# Patient Record
Sex: FEMALE | Race: WHITE | URBAN - METROPOLITAN AREA
[De-identification: names, ages, dates, MRNs, and addresses within clinical notes are randomized per-mention and may not be internally consistent; named-entity substitution may affect disease eponyms.]

---

## 2019-04-16 ENCOUNTER — HOSPITAL ENCOUNTER (OUTPATIENT)
Dept: OCCUPATIONAL THERAPY | Facility: HOSPITAL | Age: 49
End: 2019-04-17
Attending: INTERNAL MEDICINE | Admitting: INTERNAL MEDICINE

## 2019-04-25 ENCOUNTER — HISTORICAL (OUTPATIENT)
Dept: INFUSION THERAPY | Facility: HOSPITAL | Age: 49
End: 2019-04-25

## 2019-04-29 ENCOUNTER — HISTORICAL (OUTPATIENT)
Dept: ADMINISTRATIVE | Facility: HOSPITAL | Age: 49
End: 2019-04-29

## 2019-04-29 LAB
ABS NEUT (OLG): 3.89 X10(3)/MCL (ref 2.1–9.2)
ALBUMIN SERPL-MCNC: 3.7 GM/DL (ref 3.4–5)
ALBUMIN/GLOB SERPL: 1.1 {RATIO}
ALP SERPL-CCNC: 64 UNIT/L (ref 38–126)
ALT SERPL-CCNC: 42 UNIT/L (ref 12–78)
ANISOCYTOSIS BLD QL SMEAR: ABNORMAL
AST SERPL-CCNC: 34 UNIT/L (ref 15–37)
BASOPHILS # BLD AUTO: 0.1 X10(3)/MCL (ref 0–0.2)
BASOPHILS NFR BLD AUTO: 1 %
BILIRUB SERPL-MCNC: 0.5 MG/DL (ref 0.2–1)
BILIRUBIN DIRECT+TOT PNL SERPL-MCNC: 0.1 MG/DL (ref 0–0.2)
BILIRUBIN DIRECT+TOT PNL SERPL-MCNC: 0.4 MG/DL (ref 0–0.8)
BUN SERPL-MCNC: 14 MG/DL (ref 7–18)
CALCIUM SERPL-MCNC: 8.7 MG/DL (ref 8.5–10.1)
CHLORIDE SERPL-SCNC: 110 MMOL/L (ref 98–107)
CO2 SERPL-SCNC: 25 MMOL/L (ref 21–32)
CREAT SERPL-MCNC: 1.42 MG/DL (ref 0.55–1.02)
EOSINOPHIL # BLD AUTO: 0.2 X10(3)/MCL (ref 0–0.9)
EOSINOPHIL NFR BLD AUTO: 3.1 %
ERYTHROCYTE [DISTWIDTH] IN BLOOD BY AUTOMATED COUNT: 26.6 % (ref 11.5–17)
GLOBULIN SER-MCNC: 3.3 GM/DL (ref 2.4–3.5)
GLUCOSE SERPL-MCNC: 107 MG/DL (ref 74–106)
HCT VFR BLD AUTO: 37 % (ref 37–47)
HGB BLD-MCNC: 10.8 GM/DL (ref 12–16)
HYPOCHROMIA BLD QL SMEAR: ABNORMAL
LYMPHOCYTES # BLD AUTO: 2.3 X10(3)/MCL (ref 0.6–4.6)
LYMPHOCYTES NFR BLD AUTO: 33 %
MCH RBC QN AUTO: 21.5 PG (ref 27–31)
MCHC RBC AUTO-ENTMCNC: 29.2 GM/DL (ref 33–36)
MCV RBC AUTO: 73.6 FL (ref 80–94)
MICROCYTES BLD QL SMEAR: ABNORMAL
MONOCYTES # BLD AUTO: 0.6 X10(3)/MCL (ref 0.1–1.3)
MONOCYTES NFR BLD AUTO: 7.9 %
NEUTROPHILS # BLD AUTO: 3.9 X10(3)/MCL (ref 2.1–9.2)
NEUTROPHILS NFR BLD AUTO: 54.9 %
PLATELET # BLD AUTO: 613 X10(3)/MCL (ref 130–400)
PLATELET # BLD EST: ABNORMAL 10*3/UL
PMV BLD AUTO: 8.8 FL (ref 9.4–12.4)
POIKILOCYTOSIS BLD QL SMEAR: ABNORMAL
POTASSIUM SERPL-SCNC: 5 MMOL/L (ref 3.5–5.1)
PROT SERPL-MCNC: 7 GM/DL (ref 6.4–8.2)
RBC # BLD AUTO: 5.03 X10(6)/MCL (ref 4.2–5.4)
RBC MORPH BLD: ABNORMAL
SODIUM SERPL-SCNC: 139 MMOL/L (ref 136–145)
WBC # SPEC AUTO: 7.1 X10(3)/MCL (ref 4.5–11.5)

## 2019-05-02 ENCOUNTER — HISTORICAL (OUTPATIENT)
Dept: INFUSION THERAPY | Facility: HOSPITAL | Age: 49
End: 2019-05-02

## 2019-05-24 ENCOUNTER — HISTORICAL (OUTPATIENT)
Dept: HEMATOLOGY/ONCOLOGY | Facility: CLINIC | Age: 49
End: 2019-05-24

## 2019-05-24 LAB
ABS NEUT (OLG): 3.59 X10(3)/MCL (ref 2.1–9.2)
ALBUMIN SERPL-MCNC: 3.6 GM/DL (ref 3.4–5)
ALBUMIN/GLOB SERPL: 1.2 RATIO (ref 1.1–2)
ALP SERPL-CCNC: 58 UNIT/L (ref 38–126)
ALT SERPL-CCNC: 31 UNIT/L (ref 12–78)
AST SERPL-CCNC: 13 UNIT/L (ref 15–37)
BASOPHILS # BLD AUTO: 0 X10(3)/MCL (ref 0–0.2)
BASOPHILS NFR BLD AUTO: 0.6 %
BILIRUB SERPL-MCNC: 0.5 MG/DL (ref 0.2–1)
BILIRUBIN DIRECT+TOT PNL SERPL-MCNC: 0.1 MG/DL (ref 0–0.5)
BILIRUBIN DIRECT+TOT PNL SERPL-MCNC: 0.4 MG/DL (ref 0–0.8)
BUN SERPL-MCNC: 11 MG/DL (ref 7–18)
CALCIUM SERPL-MCNC: 8.6 MG/DL (ref 8.5–10.1)
CHLORIDE SERPL-SCNC: 110 MMOL/L (ref 98–107)
CO2 SERPL-SCNC: 29 MMOL/L (ref 21–32)
CREAT SERPL-MCNC: 0.67 MG/DL (ref 0.55–1.02)
EOSINOPHIL # BLD AUTO: 0.2 X10(3)/MCL (ref 0–0.9)
EOSINOPHIL NFR BLD AUTO: 3 %
ERYTHROCYTE [DISTWIDTH] IN BLOOD BY AUTOMATED COUNT: 26.2 % (ref 11.5–17)
FERRITIN SERPL-MCNC: 184.2 NG/ML (ref 8–388)
GLOBULIN SER-MCNC: 2.9 GM/DL (ref 2.4–3.5)
GLUCOSE SERPL-MCNC: 107 MG/DL (ref 74–106)
HCT VFR BLD AUTO: 40.7 % (ref 37–47)
HGB BLD-MCNC: 12.4 GM/DL (ref 12–16)
IRON SATN MFR SERPL: 53.1 % (ref 20–50)
IRON SERPL-MCNC: 139 MCG/DL (ref 50–175)
LYMPHOCYTES # BLD AUTO: 2 X10(3)/MCL (ref 0.6–4.6)
LYMPHOCYTES NFR BLD AUTO: 30.9 %
MCH RBC QN AUTO: 25.6 PG (ref 27–31)
MCHC RBC AUTO-ENTMCNC: 30.5 GM/DL (ref 33–36)
MCV RBC AUTO: 84.1 FL (ref 80–94)
MONOCYTES # BLD AUTO: 0.6 X10(3)/MCL (ref 0.1–1.3)
MONOCYTES NFR BLD AUTO: 8.8 %
NEUTROPHILS # BLD AUTO: 3.6 X10(3)/MCL (ref 2.1–9.2)
NEUTROPHILS NFR BLD AUTO: 56.4 %
PLATELET # BLD AUTO: 279 X10(3)/MCL (ref 130–400)
PMV BLD AUTO: 9.1 FL (ref 9.4–12.4)
POTASSIUM SERPL-SCNC: 4.2 MMOL/L (ref 3.5–5.1)
PROT SERPL-MCNC: 6.5 GM/DL (ref 6.4–8.2)
RBC # BLD AUTO: 4.84 X10(6)/MCL (ref 4.2–5.4)
SODIUM SERPL-SCNC: 140 MMOL/L (ref 136–145)
TIBC SERPL-MCNC: 262 MCG/DL (ref 250–450)
TRANSFERRIN SERPL-MCNC: 195 MG/DL (ref 200–360)
WBC # SPEC AUTO: 6.4 X10(3)/MCL (ref 4.5–11.5)

## 2019-06-05 ENCOUNTER — HISTORICAL (OUTPATIENT)
Dept: ADMINISTRATIVE | Facility: HOSPITAL | Age: 49
End: 2019-06-05

## 2019-06-05 LAB
ABS NEUT (OLG): 4.35 X10(3)/MCL (ref 2.1–9.2)
BASOPHILS # BLD AUTO: 0 X10(3)/MCL (ref 0–0.2)
BASOPHILS NFR BLD AUTO: 0.5 %
EOSINOPHIL # BLD AUTO: 0.3 X10(3)/MCL (ref 0–0.9)
EOSINOPHIL NFR BLD AUTO: 4.1 %
ERYTHROCYTE [DISTWIDTH] IN BLOOD BY AUTOMATED COUNT: 22.1 % (ref 11.5–17)
HCT VFR BLD AUTO: 39.6 % (ref 37–47)
HGB BLD-MCNC: 12.6 GM/DL (ref 12–16)
LYMPHOCYTES # BLD AUTO: 2.4 X10(3)/MCL (ref 0.6–4.6)
LYMPHOCYTES NFR BLD AUTO: 32.3 %
MCH RBC QN AUTO: 27.3 PG (ref 27–31)
MCHC RBC AUTO-ENTMCNC: 31.8 GM/DL (ref 33–36)
MCV RBC AUTO: 85.7 FL (ref 80–94)
MONOCYTES # BLD AUTO: 0.4 X10(3)/MCL (ref 0.1–1.3)
MONOCYTES NFR BLD AUTO: 4.9 %
NEUTROPHILS # BLD AUTO: 4.4 X10(3)/MCL (ref 2.1–9.2)
NEUTROPHILS NFR BLD AUTO: 58.1 %
PLATELET # BLD AUTO: 271 X10(3)/MCL (ref 130–400)
PMV BLD AUTO: 8.5 FL (ref 9.4–12.4)
RBC # BLD AUTO: 4.62 X10(6)/MCL (ref 4.2–5.4)
WBC # SPEC AUTO: 7.5 X10(3)/MCL (ref 4.5–11.5)

## 2019-07-17 ENCOUNTER — HISTORICAL (OUTPATIENT)
Dept: HEMATOLOGY/ONCOLOGY | Facility: CLINIC | Age: 49
End: 2019-07-17

## 2019-07-17 LAB
ABS NEUT (OLG): 3.89 X10(3)/MCL (ref 2.1–9.2)
BASOPHILS # BLD AUTO: 0 X10(3)/MCL (ref 0–0.2)
BASOPHILS NFR BLD AUTO: 0.3 %
EOSINOPHIL # BLD AUTO: 0.1 X10(3)/MCL (ref 0–0.9)
EOSINOPHIL NFR BLD AUTO: 2 %
ERYTHROCYTE [DISTWIDTH] IN BLOOD BY AUTOMATED COUNT: 13.1 % (ref 11.5–17)
HCT VFR BLD AUTO: 42.6 % (ref 37–47)
HGB BLD-MCNC: 13.6 GM/DL (ref 12–16)
LYMPHOCYTES # BLD AUTO: 1.9 X10(3)/MCL (ref 0.6–4.6)
LYMPHOCYTES NFR BLD AUTO: 30.5 %
MCH RBC QN AUTO: 30 PG (ref 27–31)
MCHC RBC AUTO-ENTMCNC: 31.9 GM/DL (ref 33–36)
MCV RBC AUTO: 93.8 FL (ref 80–94)
MONOCYTES # BLD AUTO: 0.4 X10(3)/MCL (ref 0.1–1.3)
MONOCYTES NFR BLD AUTO: 6 %
NEUTROPHILS # BLD AUTO: 3.9 X10(3)/MCL (ref 2.1–9.2)
NEUTROPHILS NFR BLD AUTO: 61 %
PLATELET # BLD AUTO: 298 X10(3)/MCL (ref 130–400)
PMV BLD AUTO: 8.9 FL (ref 9.4–12.4)
RBC # BLD AUTO: 4.54 X10(6)/MCL (ref 4.2–5.4)
WBC # SPEC AUTO: 6.4 X10(3)/MCL (ref 4.5–11.5)

## 2019-10-04 ENCOUNTER — HISTORICAL (OUTPATIENT)
Dept: HEMATOLOGY/ONCOLOGY | Facility: CLINIC | Age: 49
End: 2019-10-04

## 2022-04-28 NOTE — PROGRESS NOTES
Patient:   Carlee Cifuentes            MRN: [id number]            FIN: [id number]               Age:   48 years     Sex:  Female     :  1970   Associated Diagnoses:   Von Willebrand disease   Author:   Charmaine Young MD      HEMATOLOGY/ONCOLOGY          Visit Information   Visit type:  On-going care, Scheduled follow-up.    Accompanied by:  No one.    Referral source   History limitation:  None.      Clinical History: Patient was referred for von Willebrand's disease. She reports that she was diagnosed when she was pregnant with her twins in . She has blood transfusion in the past. She was diagnosed by her OB. She was seen by Dr Mallory after hemorrhagic stroke  in  but she does not remember much.  She also has history of chronic reflux disease and she underwent a Nissen fundoplication in the past.  In  she underwent an EGD and she was found to have large cratered esophageal ulcer and cratered gastric ulcers as well as multiple diffuse erosions with no bleeding in the duodenum.    After the procedure her H/H decreased to 6.8/21.6 and she required 3 units of blood transfusion.  She did well and has not been followed by any hematologist since .  She does not recall taking any medication for her von Willebrand's in the past.    She is here today by herself and she reports that she feels bad.  She reports night sweats, fatigue and tiredness and feels like she was 89 years old woman.  She used to be  and now she cannot exercise because she is always tired.  She reports dyspnea on exertion.  No chest pain.  She does have heavy menstrual periods.  She denies any nose bleeding gums bleeding and hematuria.  No neurological symptoms.  She denies any headaches, double vision or weakness.       Chief Complaint   2019 14:31 CDT      Patient states she feels alot better after transfusion      Interval History      Patient was admitted to the hospital for blood transfusion  when she was found to be severely anemic with a hemoglobin of 4.8.  She is here today for hospital follow-up. She reports that she feels better. She denies any fever, chills, sweats. No chest pain or short of breath. No bleeding.  Had Injectfer on 4/25/19.    Review blood work with her and her von Willebrand panel was essentially normal.  I will repeat again because she just finished her menstrual period yesterday. I also discussed with the patient von Willebrand disease. Explained to her that because many people with von Willebrand disease may have very mild signs, the condition can be difficult to diagnose. Some people live for years with the disease before it's identified. Abnormal von Willebrand panel combined with the normal multimeric distribution does not rule out Willebrand's disease since von Willebrand values can fluctuate over time. von Willebrand's factor and factor VIII are acute phase reactants. Increase values are seen in pregnancy, breast feeding, and childbirth, with estrogen-containing medications, inflammation, infection, liver disease, malignancy, and with exercise, stress, or trauma, including traumatic venipuncture. Use of aspirin or non-steroidal anti-inflammatories, cold environment, etc. Acute phase reactions can mask low baseline values in patient with von Willebrand disease.  Test results are often not accurate. Tests may need to be repeated several times, over months, before a precise diagnosis can be confirmed.            Review of Systems   All 12 points of the review of systems were obtained and pertinent as per above history      Health Status   Allergies:    Allergic Reactions (Selected)  No Known Allergies,    Allergies (1) Active Reaction  No Known Allergies None Documented   Current medications:  (Selected)   Outpatient Medications  Future  ferric carboxymaltose: 750 mg, IV Piggyback, Once-chemo, Infuse over: 30 minute(s), first dose 05/02/19 14:00:00 CDT, stop date 05/02/19  14:00:00 CDT, Routine, Give 2 doses 1 week apart, days 8, Future Order  Prescriptions  Prescribed  Carafate 1 g/10 mL oral suspension: 1 gm = 10 mL, Oral, QIDACHS, # 560 mL, 0 Refill(s)  omeprazole 40 mg oral DR capsule (pt. own): 40 mg = 1 cap(s), Oral, BID, 1 capsule twice a day for 3 months and then 1 capsule daily, # 180 cap(s), 1 Refill(s)  Documented Medications  Documented  Benadryl 25 mg oral capsule: = 2 tab(s), Oral, At Bedtime, 0 Refill(s),   Reviewed   Problem list:    All Problems  anemia / SNOMED CT 889552191 / Confirmed  depression / SNOMED CT 68119626 / Confirmed  hernia repair / SNOMED CT 23418924 / Confirmed  Review of care plan / SNOMED CT 2906454405 / Confirmed  ulcer / SNOMED CT 4909725484 / Confirmed  Von Willebrand disease / SNOMED CT [id number] / Confirmed  Resolved: Acid reflux / SNOMED CT [id number]  Resolved: At risk for falls / SNOMED CT 999830583  Problem automatically updated based on documentation on History of Falls, History of Falls in last 3 months Dennis, Dennis Fall Risk Score and/or ADLs.  Resolved: Brain aneurysm / SNOMED CT [id number]  Resolved: hearing loss / SNOMED CT 34346471  Resolved: hypotension / SNOMED CT 0520591545  Resolved: stroke with right upper paralysis / SNOMED CT 438839473  Resolved: nausea / SNOMED CT 3629976660  Resolved: vomiting / SNOMED CT 5383030059  Resolved: glasses / SNOMED CT 120118354,    Active Problems (6)  anemia   depression   hernia repair   Review of care plan   ulcer   Von Willebrand disease       Histories   Past Medical History:    Active  ulcer (3735351307): Onset in 1989 at 19 years.  Von Willebrand disease ([id number])  anemia (421860279)  depression (68513085)  hernia repair (08428860)  Resolved  glasses (218434235):  Resolved.  hearing loss (11059933):  Resolved.  Acid reflux ([id number]):  Resolved.  nausea  (3487062973):  Resolved.  vomiting (2444049373):  Resolved.  Brain aneurysm ([id number]-[id number]):  Resolved.  stroke with right upper paralysis (380902924):  Resolved.  hypotension (2808945636):  Resolved.   Family History:    Entire family history is negative.   Procedure history:    Esophagogastroduodenoscopy on 2014 at 43 Years.  Comments:  2014 13:50 - Rick MCDOWELL, Mathew GÓMEZ  auto-populated from documented surgical case  .  right arm plastic surgery.  breast lift.  repair of tear of uterus.  hernia repair.  blood transfusion.  brain aneurysm repair.   Social History        Social & Psychosocial Habits    Alcohol  2014  Use: Current    Comment: Pt quit 6 months ago. pt originall quit 2 years ago and relapsed pt on antivert now - 2014 13:12 - Chris MCDOWELL, [place] MORRIS    2014 Risk Assessment: Low Risk    Tobacco  2014 Risk Assessment: Low Risk    2019  Use: Former smoker, quit more    Patient Wants Consult For Cessation Counseling N/A.        Physical Examination   Vital Signs   2019 14:31 CDT      Temperature Oral          37.2 DegC                             Temperature Oral (calculated)             98.96 DegF                             Peripheral Pulse Rate     89 bpm                             Systolic Blood Pressure   117 mmHg                             Diastolic Blood Pressure  75 mmHg     Measurements from flowsheet : Measurements   2019 14:31 CDT      Weight Dosing             77.2 kg                             Weight Measured           77.2 kg                             Weight Measured and Calculated in Lbs     170.20 lb                             Height/Length Dosing      175 cm                             Height/Length Measured    175 cm                             BSA Measured              1.94 m2                             Body Mass Index Measured  25.21 kg/m2     General:  Alert and oriented, No acute distress.    Eye:  Pupils  are equal, round and reactive to light, Extraocular movements are intact, Normal conjunctiva.    HENT:  Normocephalic, No pharyngeal erythema.    Neck:  Supple, No jugular venous distention, No lymphadenopathy, No thyromegaly.    Respiratory:  Lungs are clear to auscultation, Respirations are non-labored, Symmetrical chest wall expansion.    Cardiovascular:  Normal rate, Regular rhythm, No murmur, No gallop, No edema.    Gastrointestinal:  Soft, Non-tender, Non-distended, Normal bowel sounds, No organomegaly.    Genitourinary:  No inguinal tenderness.    Lymphatics:  No lymphadenopathy neck, axilla, groin.    Musculoskeletal:  Normal range of motion, No swelling, No deformity, Normal gait.    Integumentary:  Warm, Dry.    Neurologic:  Alert, Oriented, No focal deficits, Cranial Nerves II-XII are grossly intact.    Cognition and Speech:  Oriented, Speech clear and coherent, Functional cognition intact.    Psychiatric:  Cooperative, Appropriate mood & affect, Normal judgment, Non-suicidal.    ECOG Performance Scale: 1- Strenuous physical activity restricted; fully ambulatory and able to carry out light work.      Review / Management   Radiology Reports:      Pathology Reports:        Results review:  Lab results   4/29/2019 14:20 CDT      WBC                       7.1 x10(3)/mcL                             RBC                       5.03 x10(6)/mcL                             Hgb                       10.8 gm/dL  LOW                             Hct                       37.0 %                             Platelet                  613 x10(3)/mcL  HI                             MCV                       73.6 fL  LOW                             MCH                       21.5 pg  LOW                             MCHC                      29.2 gm/dL  LOW                             RDW                       26.6 %  HI                             MPV                       8.8 fL  LOW                             Abs Neut                   3.89 x10(3)/mcL                             NEUT%                     54.9 %  NA                             NEUT#                     3.9 x10(3)/mcL                             LYMPH%                    33.0 %  NA                             LYMPH#                    2.3 x10(3)/mcL                             MONO%                     7.9 %  NA                             MONO#                     0.6 x10(3)/mcL                             EOS%                      3.1 %  NA                             EOS#                      0.2 x10(3)/mcL                             BASO%                     1.0 %  NA                             BASO#                     0.1 x10(3)/mcL  , Last 10 Days Lab Results : Lab View   4/29/2019 14:20 CDT      Hgb                       10.8 gm/dL  LOW    4/17/2019 19:04 CDT      Hgb                       9.3 gm/dL  LOW    4/17/2019 4:40 CDT       Hgb                       7.9 gm/dL  LOW    4/16/2019 14:39 CDT      Hgb                       4.8 gm/dL  CRIT    4/16/2019 14:33 CDT      Sodium Lvl                137 mmol/L                             Potassium Lvl             3.7 mmol/L                             Chloride                  108 mmol/L  HI                             CO2                       23.0 mmol/L                             Calcium Lvl               8.7 mg/dL                             Glucose Lvl               114 mg/dL  HI                             BUN                       15.0 mg/dL                             Creatinine                0.79 mg/dL                             eGFR-AA                   >60 mL/min/1.73 m2  NA                             eGFR-MYLENE                  >60 mL/min/1.73 m2  NA                             Bili Total                0.6 mg/dL                             Bili Direct               0.10 mg/dL                             Bili Indirect             0.50 mg/dL                             AST                       8 unit/L   LOW                             ALT                       15 unit/L                             Alk Phos                  54 unit/L                             Total Protein             7.2 gm/dL                             Albumin Lvl               3.90 gm/dL                             Globulin                  3.30 gm/dL                             A/G Ratio                 1.2 ratio                             LDH                       136 unit/L                             Iron Lvl                  16 mcg/dL  LOW                             Transferrin               396.0 mg/dL  HI                             TIBC                      504 mcg/dL  HI                             Iron Sat                  3.2 %  LOW                             Ferritin Lvl              1.2 ng/mL  LOW                             Folate Lvl                23.4 ng/mL  HI                             Vitamin B12 Lvl           594 pg/mL                             C3 Complement-LC          143 mg/dL                             C4 Complement-LC          17 mg/dL                             TSH                       4.980 mIU/L  HI                             PT                        14.0 second(s)                             INR                       1.1                             PTT                       24.8 second(s)                             Fibrinogen                327.0 mg/dL                             Factor VIII               289 %  HI                             VonWill Fact Ag-LC        199 %                             Von Will Multi-LC         Comment                             vWF Activity-LC           129 %  .    Radiology results   Rad Results Last 10 Days       Documentation reviewed:  Records from referring physician.    Response to Treatment   PERIPHERAL BLOOD: Severe microcytic, hypochromic anemia with moderate variation in red cell size and shape.  No schistocytes are seen.  Polychromasia is present.  Leukocytes are normal in number, differential, and morphology. Platelets are normal in number and morphology.   Comment:  Microcytosis in the setting of an elevated RDW may be seen with iron deficiency, hemoglobin H disease, anemia of chronic disease, and thalassemia minor.  The patient's clinical history of von Willebrand disease is noted. China Jernigan M.D.         Impression and Plan   Diagnosis     Von Willebrand disease ([id number]-ZY D68.0).       IMPRESSION:    von Willebrand's disease  Iron deficiency anemia    PLAN:    Keep appt for Injectafer  #2.  Repeat vWD panel. she just finished her menstrual period.   If vWD Activity low then will give DDAVP chalenge to see if she reponse.  RTC 4 weeks with labs: CBC, CMP, iron profile and ferritin  Encouraged to call for any questions or problems  The patient was given ample opportunity to ask questions and they were all answered to satisfaction; patient demonstrated understanding of what we discussed and is agreeable to the plan.      PEBBLES RODRIGEZ MD     [facility]:   Intranasal dosing  Intranasal administration of DDAVP has become a favored choice for many patients who have less serious bleeding not requiring a hospital visit or who need treatment prior to a planned minor invasive procedure since therapy can be administered at home in a timely manner. As an example, intranasal therapy at the onset of menses has been used by women with VWD to control excessive menstrual bleeding [16-18]. Benefits also have been noted for epistaxis or bleeding related to minor surgery or tooth extraction.  The usual dose of intranasal DDAVP is 150 mcg for individuals weighing less than 50 kg and 300 mcg for larger individuals [9]. The spray is administered by metered puffs, each puff containing 150 mcg; one puff each is administered into separate nostrils when using the larger dose of 300 mcg. In adults, the VWF levels achieved with 300 mcg of intranasal spray are  approximately equivalent to that seen with an intravenous dose of 0.2 mcg/kg [19].

## 2022-04-28 NOTE — PROGRESS NOTES
Patient:   Carlee Cifuentes            MRN: [id number]            FIN: [id number]               Age:   48 years     Sex:  Female     :  1970   Associated Diagnoses:   Von Willebrand disease   Author:   Valarie Elizabeth NP      HEMATOLOGY/ONCOLOGY          Visit Information   Clinical History: Patient was referred for von Willebrand's disease. She reports that she was diagnosed when she was pregnant with her twins in . She has blood transfusion in the past. She was diagnosed by her OB. She was seen by Dr Mallory after hemorrhagic stroke  in  but she does not remember much.  She also has history of chronic reflux disease and she underwent a Nissen fundoplication in the past.  In  she underwent an EGD and she was found to have large cratered esophageal ulcer and cratered gastric ulcers as well as multiple diffuse erosions with no bleeding in the duodenum.    After the procedure her H/H decreased to 6.8/21.6 and she required 3 units of blood transfusion.  She did well and has not been followed by any hematologist since .  She does not recall taking any medication for her von Willebrand's in the past.       On 2019,  patient was admitted to the hospital for blood transfusion when she was found to be severely anemic with a hemoglobin of 4.8.  Had Injectfer on 19 and 19.         Chief Complaint      Interval History   Patient presents today for 4 week follow-up. Her von Willebrand panel was repeated on 2019 and it was normal again. She says that she has been bleeding heavily for the past 10 days due to her menstrual cycle. He says that she feels tired and weak. Labs on 2019 showed normal hemoglobin and hematocrit. CBC repeated today shows normal hemoglobin and hematocrit.         Review of Systems   Constitutional:  Fatigue, No fever, No chills, No sweats.    Respiratory:  No shortness of breath, No cough.    Cardiovascular:  No chest pain.    Gastrointestinal:   No nausea, No vomiting, No diarrhea, No constipation.    Musculoskeletal:  Negative.       Health Status   Allergies:    Allergic Reactions (Selected)  No Known Allergies,    Allergies (1) Active Reaction  No Known Allergies None Documented   Current medications:  (Selected)   Prescriptions  Prescribed  Carafate 1 g/10 mL oral suspension: 1 gm = 10 mL, Oral, QIDACHS, # 560 mL, 0 Refill(s)  omeprazole 40 mg oral DR capsule (pt. own): 40 mg = 1 cap(s), Oral, BID, 1 capsule twice a day for 3 months and then 1 capsule daily, # 180 cap(s), 1 Refill(s)  Documented Medications  Documented  Benadryl 25 mg oral capsule: = 2 tab(s), Oral, At Bedtime, 0 Refill(s),   Reviewed   Problem list:    All Problems  anemia / SNOMED CT 184625175 / Confirmed  depression / SNOMED CT 51020492 / Confirmed  hernia repair / SNOMED CT 09027689 / Confirmed  Review of care plan / SNOMED CT 4436788237 / Confirmed  ulcer / SNOMED CT 3444587127 / Confirmed  Von Willebrand disease / SNOMED CT [id number] / Confirmed  Resolved: Acid reflux / SNOMED CT [id number]  Resolved: At risk for falls / SNOMED CT 872884979  Problem automatically updated based on documentation on History of Falls, History of Falls in last 3 months Dennis, Dennis Fall Risk Score and/or ADLs.  Resolved: Brain aneurysm / SNOMED CT [id number]  Resolved: hearing loss / SNOMED CT 48522778  Resolved: hypotension / SNOMED CT 0342258239  Resolved: stroke with right upper paralysis / SNOMED CT 133901308  Resolved: nausea / SNOMED CT 8851741218  Resolved: vomiting / SNOMED CT 4056588138  Resolved: glasses / SNOMED CT 450581771,    Active Problems (6)  anemia   depression   hernia repair   Review of care plan   ulcer   Von Willebrand disease       Histories   Past Medical History:    Active  ulcer (4306239004): Onset in 1989 at 19 years.  Von Willebrand disease ([id number])  anemia  (901920400)  depression (39064500)  hernia repair (57382032)  Resolved  glasses (020771915):  Resolved.  hearing loss (79597339):  Resolved.  Acid reflux ([id number]):  Resolved.  nausea (4020580564):  Resolved.  vomiting (2438392195):  Resolved.  Brain aneurysm ([id number]):  Resolved.  stroke with right upper paralysis (200096766):  Resolved.  hypotension (9363631267):  Resolved.   Family History:    Entire family history is negative.   Procedure history:    Esophagogastroduodenoscopy on 2014 at 43 Years.  Comments:  2014 13:50 - Rick MCDOWELL, Mathew GÓMEZ  auto-populated from documented surgical case  .  right arm plastic surgery.  breast lift.  repair of tear of uterus.  hernia repair.  blood transfusion.  brain aneurysm repair.   Social History        Social & Psychosocial Habits    Alcohol  2014  Use: Current    Comment: Pt quit 6 months ago. pt originall quit 2 years ago and relapsed pt on antivert now - 2014 13:12 - Chris MCDOWELL, [place] MORRIS    2014 Risk Assessment: Low Risk    Tobacco  2014 Risk Assessment: Low Risk    2019  Use: Former smoker, quit more    Patient Wants Consult For Cessation Counseling N/A    2019  Use: Former smoker, quit more    Patient Wants Consult For Cessation Counseling N/A.        Physical Examination   Vital Signs   2019 13:47 CDT       Temperature Oral          36.5 DegC                             Temperature Oral (calculated)             97.70 DegF                             Peripheral Pulse Rate     79 bpm                             Systolic Blood Pressure   120 mmHg                             Diastolic Blood Pressure  76 mmHg     General:  Alert and oriented, No acute distress.    Eye:  Pupils are equal, round and reactive to light, Extraocular movements are intact, Normal conjunctiva.    HENT:  Normocephalic, No pharyngeal erythema.    Neck:  Supple, No jugular venous  distention, No lymphadenopathy, No thyromegaly.    Respiratory:  Lungs are clear to auscultation, Respirations are non-labored, Symmetrical chest wall expansion.    Cardiovascular:  Normal rate, Regular rhythm, No murmur, No gallop, No edema.    Gastrointestinal:  Soft, Non-tender, Non-distended, Normal bowel sounds, No organomegaly.    Genitourinary:  No inguinal tenderness.    Lymphatics:  No lymphadenopathy neck, axilla, groin.    Musculoskeletal:  Normal range of motion, No swelling, No deformity, Normal gait.    Integumentary:  Warm, Dry.    Neurologic:  Alert, Oriented, No focal deficits, Cranial Nerves II-XII are grossly intact.    Cognition and Speech:  Oriented, Speech clear and coherent, Functional cognition intact.    Psychiatric:  Cooperative, Appropriate mood & affect, Normal judgment, Non-suicidal.    ECOG Performance Scale: 1- Strenuous physical activity restricted; fully ambulatory and able to carry out light work.      Review / Management   Radiology Reports:      Pathology Reports:        Results review:  Last 10 Days Lab Results : Lab View   6/5/2019 14:09 CDT       WBC                       7.5 x10(3)/mcL                             RBC                       4.62 x10(6)/mcL                             Hgb                       12.6 gm/dL                             Hct                       39.6 %                             Platelet                  271 x10(3)/mcL                             MCV                       85.7 fL                             MCH                       27.3 pg                             MCHC                      31.8 gm/dL  LOW                             RDW                       22.1 %  HI                             MPV                       8.5 fL  LOW                             Abs Neut                  4.35 x10(3)/mcL                             NEUT%                     58.1 %  NA                             NEUT#                     4.4 x10(3)/mcL                              LYMPH%                    32.3 %  NA                             LYMPH#                    2.4 x10(3)/mcL                             MONO%                     4.9 %  NA                             MONO#                     0.4 x10(3)/mcL                             EOS%                      4.1 %  NA                             EOS#                      0.3 x10(3)/mcL                             BASO%                     0.5 %  NA                             BASO#                     0.0 x10(3)/mcL  .         Interpretation: Consistent with previous results.    Radiology results   Rad Results Last 10 Days       Documentation reviewed:  Records from referring physician.    Response to Treatment   PERIPHERAL BLOOD: Severe microcytic, hypochromic anemia with moderate variation in red cell size and shape.  No schistocytes are seen.  Polychromasia is present. Leukocytes are normal in number, differential, and morphology. Platelets are normal in number and morphology.   Comment:  Microcytosis in the setting of an elevated RDW may be seen with iron deficiency, hemoglobin H disease, anemia of chronic disease, and thalassemia minor.  The patient's clinical history of von Willebrand disease is noted. China Jernigan M.D.         Impression and Plan   Diagnosis     Von Willebrand disease (WLU81-PM D68.0).       IMPRESSION:    von Willebrand's disease  Iron deficiency anemia    PLAN:  Repeated vWD panel. It was normal again.   Case discussed with -since she has been diagnosed with VWD in the past and she has had a hx of bleeding after surgery and she has had a stroke due to spontaneous intracranial bleeding (according to the patient), we will go ahead a prescribe Stimate for use at the start of menses and prn abnormal bleeding.   Repeated CBC today-noty anemic. Recent iron level and ferritin are normal.   Repeat CBC in 6 weeks.  RTC in 3 months with labs.     Encouraged to call for any questions or  problems  The patient was given ample opportunity to ask questions and they were all answered to satisfaction; patient demonstrated understanding of what we discussed and is agreeable to the plan.    YANETH Chi

## 2022-05-04 ENCOUNTER — DOCUMENTATION ONLY (OUTPATIENT)
Dept: HEMATOLOGY/ONCOLOGY | Facility: CLINIC | Age: 52
End: 2022-05-04

## 2022-05-04 ENCOUNTER — TELEPHONE (OUTPATIENT)
Dept: HEMATOLOGY/ONCOLOGY | Facility: CLINIC | Age: 52
End: 2022-05-04

## 2022-05-04 NOTE — PROGRESS NOTES
Spoke with Art @ surgeon's office. Informed her that patient has vonWillebrand type 1. She voiced understanding.

## 2022-05-04 NOTE — TELEPHONE ENCOUNTER
She was diagnosed be her OBGYN in 2002. Dr. Young saw her 3 times in 2019. Her VWB levels were normal at that time- this indicates that she is type 1.